# Patient Record
Sex: FEMALE | Race: WHITE | NOT HISPANIC OR LATINO | Employment: FULL TIME | URBAN - METROPOLITAN AREA
[De-identification: names, ages, dates, MRNs, and addresses within clinical notes are randomized per-mention and may not be internally consistent; named-entity substitution may affect disease eponyms.]

---

## 2023-11-24 ENCOUNTER — HOSPITAL ENCOUNTER (OUTPATIENT)
Facility: HOSPITAL | Age: 33
Setting detail: OBSERVATION
Discharge: HOME | End: 2023-11-24
Attending: STUDENT IN AN ORGANIZED HEALTH CARE EDUCATION/TRAINING PROGRAM | Admitting: EMERGENCY MEDICINE
Payer: COMMERCIAL

## 2023-11-24 ENCOUNTER — APPOINTMENT (OUTPATIENT)
Dept: CT IMAGING | Facility: HOSPITAL | Age: 33
End: 2023-11-24
Payer: COMMERCIAL

## 2023-11-24 VITALS
HEIGHT: 64 IN | RESPIRATION RATE: 16 BRPM | BODY MASS INDEX: 26.46 KG/M2 | OXYGEN SATURATION: 98 % | SYSTOLIC BLOOD PRESSURE: 105 MMHG | DIASTOLIC BLOOD PRESSURE: 69 MMHG | HEART RATE: 75 BPM | WEIGHT: 155 LBS | TEMPERATURE: 97.5 F

## 2023-11-24 DIAGNOSIS — R19.7 DIARRHEA, UNSPECIFIED TYPE: ICD-10-CM

## 2023-11-24 DIAGNOSIS — R19.7 NAUSEA, VOMITING, AND DIARRHEA: ICD-10-CM

## 2023-11-24 DIAGNOSIS — N13.30 HYDRONEPHROSIS OF RIGHT KIDNEY: ICD-10-CM

## 2023-11-24 DIAGNOSIS — N20.1 URETERAL STONE: ICD-10-CM

## 2023-11-24 DIAGNOSIS — N23 RENAL COLIC: ICD-10-CM

## 2023-11-24 DIAGNOSIS — N39.0 ACUTE UTI (URINARY TRACT INFECTION): ICD-10-CM

## 2023-11-24 DIAGNOSIS — R11.2 NAUSEA, VOMITING, AND DIARRHEA: ICD-10-CM

## 2023-11-24 DIAGNOSIS — R10.9 RIGHT FLANK PAIN: Primary | ICD-10-CM

## 2023-11-24 DIAGNOSIS — K62.89 RECTUM INFLAMMATION: ICD-10-CM

## 2023-11-24 LAB
ALBUMIN SERPL-MCNC: 4.5 G/DL (ref 3.5–5.2)
ALBUMIN/GLOB SERPL: 1.6 G/DL
ALP SERPL-CCNC: 54 U/L (ref 39–117)
ALT SERPL W P-5'-P-CCNC: 23 U/L (ref 1–33)
ANION GAP SERPL CALCULATED.3IONS-SCNC: 14.4 MMOL/L (ref 5–15)
AST SERPL-CCNC: 23 U/L (ref 1–32)
BACTERIA UR QL AUTO: ABNORMAL /HPF
BASOPHILS # BLD AUTO: 0.05 10*3/MM3 (ref 0–0.2)
BASOPHILS NFR BLD AUTO: 0.5 % (ref 0–1.5)
BILIRUB SERPL-MCNC: 1.2 MG/DL (ref 0–1.2)
BILIRUB UR QL STRIP: NEGATIVE
BUN SERPL-MCNC: 12 MG/DL (ref 6–20)
BUN/CREAT SERPL: 14.3 (ref 7–25)
CALCIUM SPEC-SCNC: 9.4 MG/DL (ref 8.6–10.5)
CHLORIDE SERPL-SCNC: 99 MMOL/L (ref 98–107)
CLARITY UR: ABNORMAL
CO2 SERPL-SCNC: 23.6 MMOL/L (ref 22–29)
COD CRY URNS QL: ABNORMAL /HPF
COLOR UR: ABNORMAL
CREAT SERPL-MCNC: 0.84 MG/DL (ref 0.57–1)
D-LACTATE SERPL-SCNC: 0.9 MMOL/L (ref 0.5–2)
DEPRECATED RDW RBC AUTO: 39.8 FL (ref 37–54)
EGFRCR SERPLBLD CKD-EPI 2021: 95.4 ML/MIN/1.73
EOSINOPHIL # BLD AUTO: 0.09 10*3/MM3 (ref 0–0.4)
EOSINOPHIL NFR BLD AUTO: 0.9 % (ref 0.3–6.2)
ERYTHROCYTE [DISTWIDTH] IN BLOOD BY AUTOMATED COUNT: 12.7 % (ref 12.3–15.4)
GLOBULIN UR ELPH-MCNC: 2.8 GM/DL
GLUCOSE SERPL-MCNC: 97 MG/DL (ref 65–99)
GLUCOSE UR STRIP-MCNC: NEGATIVE MG/DL
HCG SERPL QL: NEGATIVE
HCT VFR BLD AUTO: 42.1 % (ref 34–46.6)
HGB BLD-MCNC: 14.3 G/DL (ref 12–15.9)
HGB UR QL STRIP.AUTO: ABNORMAL
HYALINE CASTS UR QL AUTO: ABNORMAL /LPF
IMM GRANULOCYTES # BLD AUTO: 0.03 10*3/MM3 (ref 0–0.05)
IMM GRANULOCYTES NFR BLD AUTO: 0.3 % (ref 0–0.5)
KETONES UR QL STRIP: ABNORMAL
LEUKOCYTE ESTERASE UR QL STRIP.AUTO: ABNORMAL
LIPASE SERPL-CCNC: 41 U/L (ref 13–60)
LYMPHOCYTES # BLD AUTO: 2.17 10*3/MM3 (ref 0.7–3.1)
LYMPHOCYTES NFR BLD AUTO: 22.1 % (ref 19.6–45.3)
MAGNESIUM SERPL-MCNC: 2.3 MG/DL (ref 1.6–2.6)
MCH RBC QN AUTO: 29.6 PG (ref 26.6–33)
MCHC RBC AUTO-ENTMCNC: 34 G/DL (ref 31.5–35.7)
MCV RBC AUTO: 87.2 FL (ref 79–97)
MONOCYTES # BLD AUTO: 0.52 10*3/MM3 (ref 0.1–0.9)
MONOCYTES NFR BLD AUTO: 5.3 % (ref 5–12)
NEUTROPHILS NFR BLD AUTO: 6.94 10*3/MM3 (ref 1.7–7)
NEUTROPHILS NFR BLD AUTO: 70.9 % (ref 42.7–76)
NITRITE UR QL STRIP: NEGATIVE
NRBC BLD AUTO-RTO: 0 /100 WBC (ref 0–0.2)
PH UR STRIP.AUTO: 6.5 [PH] (ref 5–8)
PLATELET # BLD AUTO: 276 10*3/MM3 (ref 140–450)
PMV BLD AUTO: 11 FL (ref 6–12)
POTASSIUM SERPL-SCNC: 3.2 MMOL/L (ref 3.5–5.2)
PROT SERPL-MCNC: 7.3 G/DL (ref 6–8.5)
PROT UR QL STRIP: ABNORMAL
RBC # BLD AUTO: 4.83 10*6/MM3 (ref 3.77–5.28)
RBC # UR STRIP: ABNORMAL /HPF
REF LAB TEST METHOD: ABNORMAL
SODIUM SERPL-SCNC: 137 MMOL/L (ref 136–145)
SP GR UR STRIP: 1.02 (ref 1–1.03)
SQUAMOUS #/AREA URNS HPF: ABNORMAL /HPF
UROBILINOGEN UR QL STRIP: ABNORMAL
WBC # UR STRIP: ABNORMAL /HPF
WBC NRBC COR # BLD AUTO: 9.8 10*3/MM3 (ref 3.4–10.8)

## 2023-11-24 PROCEDURE — 25510000001 IOPAMIDOL 61 % SOLUTION: Performed by: STUDENT IN AN ORGANIZED HEALTH CARE EDUCATION/TRAINING PROGRAM

## 2023-11-24 PROCEDURE — G0378 HOSPITAL OBSERVATION PER HR: HCPCS

## 2023-11-24 PROCEDURE — 85025 COMPLETE CBC W/AUTO DIFF WBC: CPT | Performed by: STUDENT IN AN ORGANIZED HEALTH CARE EDUCATION/TRAINING PROGRAM

## 2023-11-24 PROCEDURE — 25010000002 CEFTRIAXONE PER 250 MG: Performed by: PHYSICIAN ASSISTANT

## 2023-11-24 PROCEDURE — 80053 COMPREHEN METABOLIC PANEL: CPT | Performed by: STUDENT IN AN ORGANIZED HEALTH CARE EDUCATION/TRAINING PROGRAM

## 2023-11-24 PROCEDURE — 25010000002 KETOROLAC TROMETHAMINE PER 15 MG: Performed by: PHYSICIAN ASSISTANT

## 2023-11-24 PROCEDURE — 81001 URINALYSIS AUTO W/SCOPE: CPT | Performed by: STUDENT IN AN ORGANIZED HEALTH CARE EDUCATION/TRAINING PROGRAM

## 2023-11-24 PROCEDURE — 83690 ASSAY OF LIPASE: CPT | Performed by: STUDENT IN AN ORGANIZED HEALTH CARE EDUCATION/TRAINING PROGRAM

## 2023-11-24 PROCEDURE — 25810000003 SODIUM CHLORIDE 0.9 % SOLUTION: Performed by: STUDENT IN AN ORGANIZED HEALTH CARE EDUCATION/TRAINING PROGRAM

## 2023-11-24 PROCEDURE — 83735 ASSAY OF MAGNESIUM: CPT | Performed by: PHYSICIAN ASSISTANT

## 2023-11-24 PROCEDURE — 25010000002 MORPHINE PER 10 MG: Performed by: STUDENT IN AN ORGANIZED HEALTH CARE EDUCATION/TRAINING PROGRAM

## 2023-11-24 PROCEDURE — 84703 CHORIONIC GONADOTROPIN ASSAY: CPT | Performed by: STUDENT IN AN ORGANIZED HEALTH CARE EDUCATION/TRAINING PROGRAM

## 2023-11-24 PROCEDURE — 36415 COLL VENOUS BLD VENIPUNCTURE: CPT

## 2023-11-24 PROCEDURE — 25010000002 HYDROMORPHONE PER 4 MG: Performed by: STUDENT IN AN ORGANIZED HEALTH CARE EDUCATION/TRAINING PROGRAM

## 2023-11-24 PROCEDURE — 99285 EMERGENCY DEPT VISIT HI MDM: CPT

## 2023-11-24 PROCEDURE — 96374 THER/PROPH/DIAG INJ IV PUSH: CPT

## 2023-11-24 PROCEDURE — 96376 TX/PRO/DX INJ SAME DRUG ADON: CPT

## 2023-11-24 PROCEDURE — 83605 ASSAY OF LACTIC ACID: CPT | Performed by: PHYSICIAN ASSISTANT

## 2023-11-24 PROCEDURE — 87040 BLOOD CULTURE FOR BACTERIA: CPT | Performed by: PHYSICIAN ASSISTANT

## 2023-11-24 PROCEDURE — 25010000002 ONDANSETRON PER 1 MG: Performed by: STUDENT IN AN ORGANIZED HEALTH CARE EDUCATION/TRAINING PROGRAM

## 2023-11-24 PROCEDURE — 96375 TX/PRO/DX INJ NEW DRUG ADDON: CPT

## 2023-11-24 PROCEDURE — 25010000002 HYDROMORPHONE PER 4 MG: Performed by: EMERGENCY MEDICINE

## 2023-11-24 PROCEDURE — 74177 CT ABD & PELVIS W/CONTRAST: CPT

## 2023-11-24 RX ORDER — TAMSULOSIN HYDROCHLORIDE 0.4 MG/1
0.4 CAPSULE ORAL DAILY
Status: DISCONTINUED | OUTPATIENT
Start: 2023-11-24 | End: 2023-11-24 | Stop reason: HOSPADM

## 2023-11-24 RX ORDER — HYDROMORPHONE HYDROCHLORIDE 1 MG/ML
0.5 INJECTION, SOLUTION INTRAMUSCULAR; INTRAVENOUS; SUBCUTANEOUS
Status: DISCONTINUED | OUTPATIENT
Start: 2023-11-24 | End: 2023-11-24 | Stop reason: HOSPADM

## 2023-11-24 RX ORDER — KETOROLAC TROMETHAMINE 15 MG/ML
15 INJECTION, SOLUTION INTRAMUSCULAR; INTRAVENOUS ONCE
Status: COMPLETED | OUTPATIENT
Start: 2023-11-24 | End: 2023-11-24

## 2023-11-24 RX ORDER — SODIUM CHLORIDE 0.9 % (FLUSH) 0.9 %
10 SYRINGE (ML) INJECTION AS NEEDED
Status: DISCONTINUED | OUTPATIENT
Start: 2023-11-24 | End: 2023-11-24 | Stop reason: HOSPADM

## 2023-11-24 RX ORDER — MORPHINE SULFATE 2 MG/ML
4 INJECTION, SOLUTION INTRAMUSCULAR; INTRAVENOUS ONCE
Status: COMPLETED | OUTPATIENT
Start: 2023-11-24 | End: 2023-11-24

## 2023-11-24 RX ORDER — IBUPROFEN 800 MG/1
800 TABLET ORAL EVERY 8 HOURS PRN
Qty: 15 TABLET | Refills: 0 | Status: SHIPPED | OUTPATIENT
Start: 2023-11-24 | End: 2023-11-29

## 2023-11-24 RX ORDER — ALBUTEROL SULFATE 1.25 MG/3ML
1 SOLUTION RESPIRATORY (INHALATION) EVERY 6 HOURS PRN
Status: DISCONTINUED | OUTPATIENT
Start: 2023-11-24 | End: 2023-11-24 | Stop reason: HOSPADM

## 2023-11-24 RX ORDER — CHOLECALCIFEROL (VITAMIN D3) 125 MCG
5 CAPSULE ORAL NIGHTLY PRN
Status: DISCONTINUED | OUTPATIENT
Start: 2023-11-24 | End: 2023-11-24 | Stop reason: HOSPADM

## 2023-11-24 RX ORDER — ONDANSETRON 2 MG/ML
4 INJECTION INTRAMUSCULAR; INTRAVENOUS ONCE
Status: COMPLETED | OUTPATIENT
Start: 2023-11-24 | End: 2023-11-24

## 2023-11-24 RX ORDER — SODIUM CHLORIDE 9 MG/ML
40 INJECTION, SOLUTION INTRAVENOUS AS NEEDED
Status: DISCONTINUED | OUTPATIENT
Start: 2023-11-24 | End: 2023-11-24 | Stop reason: HOSPADM

## 2023-11-24 RX ORDER — ONDANSETRON 4 MG/1
4 TABLET, FILM COATED ORAL EVERY 6 HOURS PRN
Status: DISCONTINUED | OUTPATIENT
Start: 2023-11-24 | End: 2023-11-24 | Stop reason: HOSPADM

## 2023-11-24 RX ORDER — ACETAMINOPHEN 325 MG/1
650 TABLET ORAL EVERY 4 HOURS PRN
Status: DISCONTINUED | OUTPATIENT
Start: 2023-11-24 | End: 2023-11-24 | Stop reason: HOSPADM

## 2023-11-24 RX ORDER — NITROGLYCERIN 0.4 MG/1
0.4 TABLET SUBLINGUAL
Status: DISCONTINUED | OUTPATIENT
Start: 2023-11-24 | End: 2023-11-24 | Stop reason: HOSPADM

## 2023-11-24 RX ORDER — NALOXONE HCL 0.4 MG/ML
0.4 VIAL (ML) INJECTION
Status: DISCONTINUED | OUTPATIENT
Start: 2023-11-24 | End: 2023-11-24 | Stop reason: HOSPADM

## 2023-11-24 RX ORDER — SODIUM CHLORIDE 0.9 % (FLUSH) 0.9 %
10 SYRINGE (ML) INJECTION EVERY 12 HOURS SCHEDULED
Status: DISCONTINUED | OUTPATIENT
Start: 2023-11-24 | End: 2023-11-24 | Stop reason: HOSPADM

## 2023-11-24 RX ORDER — HYDROMORPHONE HYDROCHLORIDE 1 MG/ML
0.5 INJECTION, SOLUTION INTRAMUSCULAR; INTRAVENOUS; SUBCUTANEOUS ONCE
Status: COMPLETED | OUTPATIENT
Start: 2023-11-24 | End: 2023-11-24

## 2023-11-24 RX ORDER — TAMSULOSIN HYDROCHLORIDE 0.4 MG/1
0.4 CAPSULE ORAL DAILY
Status: DISCONTINUED | OUTPATIENT
Start: 2023-11-24 | End: 2023-11-24

## 2023-11-24 RX ORDER — ONDANSETRON 4 MG/1
4 TABLET, FILM COATED ORAL EVERY 6 HOURS PRN
Qty: 15 TABLET | Refills: 0 | Status: SHIPPED | OUTPATIENT
Start: 2023-11-24

## 2023-11-24 RX ORDER — ONDANSETRON 2 MG/ML
4 INJECTION INTRAMUSCULAR; INTRAVENOUS EVERY 6 HOURS PRN
Status: DISCONTINUED | OUTPATIENT
Start: 2023-11-24 | End: 2023-11-24 | Stop reason: HOSPADM

## 2023-11-24 RX ORDER — HYDROCODONE BITARTRATE AND ACETAMINOPHEN 5; 325 MG/1; MG/1
1 TABLET ORAL EVERY 4 HOURS PRN
Status: DISCONTINUED | OUTPATIENT
Start: 2023-11-24 | End: 2023-11-24 | Stop reason: HOSPADM

## 2023-11-24 RX ORDER — TAMSULOSIN HYDROCHLORIDE 0.4 MG/1
1 CAPSULE ORAL DAILY
Qty: 7 CAPSULE | Refills: 0 | Status: SHIPPED | OUTPATIENT
Start: 2023-11-24

## 2023-11-24 RX ORDER — SULFAMETHOXAZOLE AND TRIMETHOPRIM 800; 160 MG/1; MG/1
1 TABLET ORAL 2 TIMES DAILY
Qty: 10 TABLET | Refills: 0 | Status: SHIPPED | OUTPATIENT
Start: 2023-11-24 | End: 2023-11-29

## 2023-11-24 RX ADMIN — IOPAMIDOL 85 ML: 612 INJECTION, SOLUTION INTRAVENOUS at 05:24

## 2023-11-24 RX ADMIN — ONDANSETRON 4 MG: 2 INJECTION INTRAMUSCULAR; INTRAVENOUS at 04:25

## 2023-11-24 RX ADMIN — KETOROLAC TROMETHAMINE 15 MG: 15 INJECTION, SOLUTION INTRAMUSCULAR; INTRAVENOUS at 06:09

## 2023-11-24 RX ADMIN — HYDROMORPHONE HYDROCHLORIDE 0.5 MG: 1 INJECTION, SOLUTION INTRAMUSCULAR; INTRAVENOUS; SUBCUTANEOUS at 08:01

## 2023-11-24 RX ADMIN — MORPHINE SULFATE 4 MG: 2 INJECTION, SOLUTION INTRAMUSCULAR; INTRAVENOUS at 04:27

## 2023-11-24 RX ADMIN — CEFTRIAXONE 2000 MG: 2 INJECTION, POWDER, FOR SOLUTION INTRAMUSCULAR; INTRAVENOUS at 09:35

## 2023-11-24 RX ADMIN — SODIUM CHLORIDE 1000 ML: 9 INJECTION, SOLUTION INTRAVENOUS at 04:30

## 2023-11-24 RX ADMIN — Medication 10 ML: at 09:35

## 2023-11-24 RX ADMIN — HYDROCODONE BITARTRATE AND ACETAMINOPHEN 1 TABLET: 5; 325 TABLET ORAL at 09:48

## 2023-11-24 RX ADMIN — HYDROMORPHONE HYDROCHLORIDE 0.5 MG: 1 INJECTION, SOLUTION INTRAMUSCULAR; INTRAVENOUS; SUBCUTANEOUS at 05:20

## 2023-11-24 RX ADMIN — TAMSULOSIN HYDROCHLORIDE 0.4 MG: 0.4 CAPSULE ORAL at 09:35

## 2023-11-24 NOTE — DISCHARGE INSTRUCTIONS
Medications as directed.  Strain urine and collect stone.  Follow up with urology.  You have also been referred to gastroenterology for findings of rectal inflammation on CT scan, their office will call you to schedule.  Follow-up with your primary care provider.  Return to the emergency department with worsening symptoms, uncontrolled pain, inability to tolerate oral liquids, fever greater than 101°F not controlled by Tylenol or as needed with emergent concerns.

## 2023-11-24 NOTE — H&P
[facility]   HISTORY AND PHYSICAL    Patient Name: Soumya Stout  : 1990  MRN: [id number]  Primary Care Physician:  Lidia Pickard APRN  Date of admission: 2023    Subjective   Subjective     Chief Complaint:   Chief Complaint   Patient presents with    Abdominal Pain    Diarrhea    Vomiting         HPI:    Soumya Stout is a 33 y.o. female with history of asthma and ADHD, who presented to the emergency department today complaining of right-sided abdominal pain, nausea, vomiting, and diarrhea.  Patient states she started to feel ill last night with decreased appetite and dull right-sided abdominal pain.  She then started having diarrhea and then nausea and an episode of vomiting.  She continued to feel like she cannot get comfortable so she presented to the emergency department.  CT scan in ED shows obstructive 2 mm calculus within the right UPJ.  It also showed diffuse bladder wall thickening with mild adjacent stranding.  Also with diffuse annular thickening of the colon and rectum with submucosal fat deposition.  Urinalysis does have 1+ bacteria but nitrite negative, contaminated with 13-20 squamous epithelial cells.    Patient required several pain medications in ED, at time of arrival to observation unit her pain is well-controlled.  She denies dysuria.  She has had no further episodes of vomiting or diarrhea.  Patient is being admitted to observation unit for pain control and monitoring.  Urology consulted in the ED, provider discussed with Dr. Romero, he does not feel patient is surgical candidate and recommends discharge home.    Review of Systems   All systems were reviewed and negative except for: What is discussed in the HPI    Personal History     Past Medical History:   Diagnosis Date    ADHD (attention deficit hyperactivity disorder)     Asthma        Past Surgical History:   Procedure Laterality Date    INDUCED          Family History: family history  includes Hypertension in her mother; No Known Problems in her father. Otherwise pertinent FHx was reviewed and not pertinent to current issue.    Social History:  reports that she has never smoked. She has never used smokeless tobacco. She reports that she does not currently use alcohol after a past usage of about 2.0 standard drinks of alcohol per week. Drug use questions deferred to the physician.    Home Medications:  Etonogestrel, albuterol, albuterol sulfate HFA, doxycycline, ipratropium, melatonin, phentermine, and predniSONE    Allergies:  Allergies   Allergen Reactions    Codeine Unknown (See Comments)     From childhood  Other reaction(s): Other (See Comments)  From childhood, unknown response    Penicillins Rash       Objective   Objective     Vitals:   Temp:  [97.3 °F (36.3 °C)-98.2 °F (36.8 °C)] 97.3 °F (36.3 °C)  Heart Rate:  [63-71] 63  Resp:  [16] 16  BP: (107-162)/(70-98) 107/77  Physical Exam     GENERAL: 33 y.o. YO female a/o x 4, NAD  SKIN: Warm pink and dry   HEENT:  PERRL, EOM intact, conjunctivae normal, sclerae clear  NECK: supple, no JVD  LUNGS: Clear to auscultation bilaterally without wheezes, rales or rhonchi.  No accessory muscle use and no nasal flaring.  CARDIAC:  Regular rate and rhythm, S1-S2.  No murmurs, rubs or gallops.  No peripheral edema.  Equal pulses bilaterally.  ABDOMEN: Soft, nontender, nondistended.  No guarding or rebound tenderness.  Normal bowel sounds.  MUSCULOSKELETAL: Moves all extremities well.  No deformity.  NEURO: Cranial nerves II through XII grossly intact.  No gross focal deficits.  Alert.  Normal speech and motor.  PSYCH: Normal mood and affect      Result Review    Result Review:  I have personally reviewed the results from the time of this admission to 11/24/2023 10:34 EST and agree with these findings:  []  Laboratory list / accordion  []  Microbiology  []  Radiology  []  EKG/Telemetry   []  Cardiology/Vascular   []  Pathology  []  Old records  []   Other:  Most notable findings include:     EXAM:    CT Abdomen and Pelvis With Intravenous Contrast  IMPRESSION:       1.  Obstructive 2 mm calculus within the right ureteropelvic junction   (not visualized on  imaging) with upstream mild right renal   hydronephrosis with perinephric stranding, periureteral stranding,   delayed right nephrogram.  2.  Nonobstructive calculus measuring 2 mm within the severe pole of the   right kidney.  3.  Diffuse annular thickening of the colon and rectum with submucosal   fat deposition.  Findings may relate to proctocolitis.  Submucosal fat   deposition implies chronicity.  4.  Diffuse bladder wall thickening with mild adjacent stranding.    Findings can be seen with cystitis.  Consider correlation with laboratory   values.  5.  No other acute findings.  6.  Incidental findings as described.      Assessment & Plan   Assessment / Plan     Brief Patient Summary:  Soumya Stout is a 33 y.o. female who is being admitted observation unit for further evaluation of ureteral stone    Active Hospital Problems:  Active Hospital Problems    Diagnosis     **Ureteral stone      Plan:     Ureterolithiasis  -Pain control  -ED provider discussed with urology, they do not feel patient is a surgical candidate and recommend discharge home  -Patient received 2 g Rocephin in ED  -Flomax  -Strain urine    Diarrhea  Abnormal imaging of rectum and colon  -CT shows diffuse annular thickening of the colon and rectum with submucosal fat deposition  -Findings may relate to proctocolitis but submucosal fat deposition implies chronicity  -Patient reports episodes of diarrhea approximately 1-2 times per month  -Ambulatory referral to gastroenterology    DVT prophylaxis:  Mechanical DVT prophylaxis orders are present.    CODE STATUS:    Level Of Support Discussed With: Patient  Code Status (Patient has no pulse and is not breathing): CPR (Attempt to Resuscitate)  Medical Interventions (Patient has  pulse or is breathing): Full Support    Admission Status:  I believe this patient meets observation status.     75 minutes has been spent by [facility] providers in the care of this patient while under observation status    I wore an appropriate PPE during this patient encounter.  Hand hygeine performed before and after seeing the patient.    Electronically signed by MIKE Borges, 11/24/23, 10:34 AM EST.

## 2023-11-24 NOTE — DISCHARGE SUMMARY
ED OBSERVATION PROGRESS/DISCHARGE SUMMARY    Date of Admission: 11/24/2023   LOS: 0 days   PCP: Lidia Pickard APRN    Subjective patient reports significant improvement in her symptoms since this morning.  Denies recurrence of pain.  She is tolerating p.o., had grilled cheese sandwich for lunch without recurrence of pain or diarrhea.    Hospital Outcome: 33-year-old female admitted to the observation unit for further evaluation of right-sided abdominal pain, nausea, vomiting, diarrhea.  CT scan in ED shows obstructive 2 mm calculus within the right UPJ, diffuse bladder wall thickening and mild adjacent stranding.  Patient received pain medications and IV antibiotics.  She has been pain-free since arrival to observation unit without any additional pain medication.  Patient was discussed with urology by ED provider, they did not feel consultation and surgical intervention would be warranted at this time.  Ambulatory referral placed for urology.  Patient will be discharged with Flomax, Bactrim, Zofran, and ibuprofen.  Patient also has some findings of diffuse annular thickening of the colon and rectum on CT imaging.  Ambulatory referral to gastroenterology placed.  Usual return to ER precautions advised, patient expressed understanding and is in agreement with plan.    ROS:  General: no fevers, chills  Respiratory: no cough, dyspnea  Cardiovascular: no chest pain, palpitations  Abdomen: No abdominal pain, nausea, vomiting, or diarrhea  Neurologic: No focal weakness    Objective   Physical Exam:  I have reviewed the vital signs.  Temp:  [97.3 °F (36.3 °C)-98.2 °F (36.8 °C)] 97.5 °F (36.4 °C)  Heart Rate:  [63-75] 75  Resp:  [16] 16  BP: (105-162)/(69-98) 105/69  General Appearance:    Alert, cooperative, no distress  Head:    Normocephalic, atraumatic  Eyes:    Sclerae anicteric  Neck:   Supple, no mass  Lungs: Clear to auscultation bilaterally, respirations unlabored  Heart: Regular rate and rhythm, S1 and S2  normal, no murmur, rub or gallop  Abdomen:  Soft, nontender, bowel sounds active, nondistended  Extremities: No clubbing, cyanosis, or edema to lower extremities  Pulses:  2+ and symmetric in distal lower extremities  Skin: No rashes   Neurologic: Oriented x3, Normal strength to extremities    Results Review:    I have reviewed the labs, radiology results and diagnostic studies.    Results from last 7 days   Lab Units 11/24/23  0420   WBC 10*3/mm3 9.80   HEMOGLOBIN g/dL 14.3   HEMATOCRIT % 42.1   PLATELETS 10*3/mm3 276     Results from last 7 days   Lab Units 11/24/23  0420   SODIUM mmol/L 137   POTASSIUM mmol/L 3.2*   CHLORIDE mmol/L 99   CO2 mmol/L 23.6   BUN mg/dL 12   CREATININE mg/dL 0.84   CALCIUM mg/dL 9.4   BILIRUBIN mg/dL 1.2   ALK PHOS U/L 54   ALT (SGPT) U/L 23   AST (SGOT) U/L 23   GLUCOSE mg/dL 97     Imaging Results (Last 24 Hours)       Procedure Component Value Units Date/Time    CT Abdomen Pelvis With Contrast [[id number]] Collected: 11/24/23 0723     Updated: 11/24/23 0728    Addenda:        ADDENDUM:  11 24 23 07:28 Verify Receipt Verified receipt with  [facility]  on   11 24 07:28 (-05:00)      Signed: 11/24/23 0722 by Allen Rivera MD    Narrative:      CR  Patient: SYDNEY NUR  Time Out: 07:22  Exam(s): CT ABDOMEN + PELVIS With Contrast     EXAM:    CT Abdomen and Pelvis With Intravenous Contrast    CLINICAL HISTORY:     Reason for exam: Right-sided abdominal pain.    TECHNIQUE:    Axial computed tomography images of the abdomen and pelvis with   intravenous contrast.  CTDI is 8.04 mGy and DLP is 396.4 mGy-cm.  This CT   exam was performed according to the principle of ALARA (As Low As   Reasonably Achievable) by using one or more of the following dose   reduction techniques: automated exposure control, adjustment of the mA   and or kV according to patient size, and or use of iterative   reconstruction technique.    COMPARISON:    No relevant prior studies  available.    FINDINGS:    Lung bases:  Unremarkable.  No mass.  No consolidation.     ABDOMEN:    Liver:  Unremarkable.  No mass.    Gallbladder and bile ducts:  Unremarkable.  No calcified stones.  No   ductal dilation.    Pancreas:  Unremarkable.  No mass.  No ductal dilation.    Spleen:  Unremarkable.  No splenomegaly.    Adrenals:  Unremarkable.  No mass.    Kidneys and ureters:  Obstructive 2 mm calculus within the right   ureteropelvic junction (not visualized on  imaging) with upstream   mild right renal hydronephrosis with perinephric stranding, periureteral   stranding, delayed right nephrogram.  Nonobstructive calculus measuring 2   mm within the severe pole of the right kidney.  Bilateral renal cysts,   too small to characterize.    Stomach and bowel:  No evidence of bowel obstruction.  Diffuse annular   thickening of the colon and rectum with submucosal fat deposition.    Findings may relate to proctocolitis.  Submucosal fat deposition implies   chronicity.     PELVIS:    Appendix:  No findings to suggest acute appendicitis.    Bladder:  Diffuse bladder wall thickening with mild adjacent stranding.    Findings can be seen with cystitis.  Consider correlation with   laboratory values.    Reproductive:  Unremarkable as visualized.     ABDOMEN and PELVIS:    Intraperitoneal space:  Unremarkable.  No free air.  No significant   fluid collection.    Bones joints:  Degenerative changes in the spine.  No acute fracture.    No dislocation.    Soft tissues:  Umbilical hernia containing fat.    Vasculature:  Unremarkable.  No abdominal aortic aneurysm.    Lymph nodes:  Unremarkable.  No enlarged lymph nodes.    IMPRESSION:       1.  Obstructive 2 mm calculus within the right ureteropelvic junction   (not visualized on  imaging) with upstream mild right renal   hydronephrosis with perinephric stranding, periureteral stranding,   delayed right nephrogram.  2.  Nonobstructive calculus measuring 2 mm within  the severe pole of the   right kidney.  3.  Diffuse annular thickening of the colon and rectum with submucosal   fat deposition.  Findings may relate to proctocolitis.  Submucosal fat   deposition implies chronicity.  4.  Diffuse bladder wall thickening with mild adjacent stranding.    Findings can be seen with cystitis.  Consider correlation with laboratory   values.  5.  No other acute findings.  6.  Incidental findings as described.      Impression:            Communications:     Verify Receipt     Electronically signed by Allen Rivera MD on 11-24-23 at 0722            I have reviewed the medications.  ---------------------------------------------------------------------------------------------  Assessment & Plan   Assessment/Problem List    Ureteral stone      Plan:  Ureterolithiasis  -Pain control  -ED provider discussed with urology, they do not feel patient is a surgical candidate and recommend discharge home  -Patient received 2 g Rocephin in ED  -Flomax  -Strain urine    -Discharged with course of Flomax, ibuprofen, Zofran, and antibiotics  -Follow-up outpatient with urology     Diarrhea  Abnormal imaging of rectum and colon  -CT shows diffuse annular thickening of the colon and rectum with submucosal fat deposition  -Findings may relate to proctocolitis but submucosal fat deposition implies chronicity  -Patient reports episodes of diarrhea approximately 1-2 times per month  -Ambulatory referral to gastroenterology    Disposition: Home    Follow-up after Discharge: PCP, urology, gastroenterology    This note will serve as discharge summary    MIKE Borges 11/24/23 15:13 EST    I have worn appropriate PPE during this patient encounter and sanitized my hands with entering and exiting patient room.

## 2023-11-24 NOTE — ED PROVIDER NOTES
MD ATTESTATION NOTE    The BORA and I have discussed this patient's history, physical exam, and treatment plan.  I have reviewed the documentation and personally had a face to face interaction with the patient. I affirm the documentation and agree with the treatment and plan.  The attached note describes my personal findings.      I provided a substantive portion of the care of the patient.  I personally performed the physical exam in its entirety, and below are my findings.  For this patient encounter, the patient wore surgical mask, I wore full protective PPE including N95 and eye protection.      Brief HPI: Patient presented emergency department with right-sided abdominal pain, change in urination.  No other recent illness, fever, chills.  Some diarrhea and nausea/vomiting as well.  Started today.    PHYSICAL EXAM  ED Triage Vitals   Temp Heart Rate Resp BP SpO2   11/24/23 0343 11/24/23 0343 11/24/23 0343 11/24/23 0348 11/24/23 0343   98.2 °F (36.8 °C) 66 16 162/98 98 %      Temp src Heart Rate Source Patient Position BP Location FiO2 (%)   11/24/23 0343 11/24/23 0343 11/24/23 0348 11/24/23 0348 --   Tympanic Monitor Sitting Right arm          GENERAL: no acute distress  HENT: nares patent  EYES: no scleral icterus  CV: regular rhythm, normal rate  RESPIRATORY: normal effort  ABDOMEN: soft  MUSCULOSKELETAL: no deformity  NEURO: alert, moves all extremities, follows commands  PSYCH:  calm, cooperative  SKIN: warm, dry    Vital signs and nursing notes reviewed.        Plan: Patient presented emergency department with right-sided abdominal pain, otherwise well-appearing, vitals otherwise stable.  Labs reassuring, urine showing contamination however with pyuria/bacteriuria.  Imaging demonstrating changes consistent with right-sided ureteral stone.  Plan for reevaluation after radiology interpretation for disposition.    ED Course as of 11/26/23 1522   Fri Nov 24, 2023   0453 WBC: 9.80 [GIO]   0453 Hemoglobin: 14.3 [GIO]    0453 Hematocrit: 42.1 [GIO]   0453 Platelets: 276 [GIO]   0453 Glucose: 97 [GIO]   0453 BUN: 12 [GIO]   0453 Creatinine: 0.84 [GIO]   0453 Sodium: 137 [GIO]   0453 Potassium(!): 3.2 [GIO]   0453 Chloride: 99 [GIO]   0453 CO2: 23.6 [GIO]   0454 Lipase: 41 [GIO]   0454 Ketones, UA(!): 80 mg/dL (3+) [GIO]   0508 RBC, UA(!): 11-20 [GIO]   0508 WBC, UA(!): 3-5 [GIO]   0508 Bacteria, UA(!): 1+ [GIO]   0508 Squamous Epithelial Cells, UA(!): 13-20 [GIO]   0511 HCG Qualitative: Negative [GIO]   0529 CT abdomen pelvis interpreted myself:  Right hydroureteronephrosis with associated perinephric stranding and bladder wall thickening concerning for urinary tract infection.  Possible small stone at the UVJ [FS]   0600 Patient history, ER presentation and evaluation discussed with and patient care turned over to ALTHEA Zavala PA-C. [GIO]   0736 Patient reassessed at this time.  She continues with some mild discomfort although significantly improved.  She does have some evidence of bacteriuria and an obstructive stone on her CT imaging.  With evidence of cystitis I feel that she would most appropriately be managed with IV antibiotics, urologic consultation, continued pain control in the observation unit.  Patient agreeable with this plan and all questions answered. [DC]      ED Course User Index  [DC] Morris Zavala PA  [FS] Dallas Alberto MD  [GIO] Trey Arellano PA Shary, Frank S, MD  11/24/23 0606       Dallas Alberto MD  11/26/23 3222

## 2023-11-24 NOTE — ED NOTES
Nursing report ED to floor  Soumya Stout  33 y.o.  female    HPI :   Chief Complaint   Patient presents with    Abdominal Pain    Diarrhea    Vomiting       Admitting doctor:   Gagandeep Ritter MD    Admitting diagnosis:   The primary encounter diagnosis was Right flank pain. Diagnoses of Nausea, vomiting, and diarrhea, Hydronephrosis of right kidney, Renal colic, and Acute UTI (urinary tract infection) were also pertinent to this visit.    Code status:   Current Code Status       Date Active Code Status Order ID Comments User Context       11/24/2023 0738 CPR (Attempt to Resuscitate) [id number]  Darling Childs PA ED        Question Answer    Code Status (Patient has no pulse and is not breathing) CPR (Attempt to Resuscitate)    Medical Interventions (Patient has pulse or is breathing) Full Support    Level Of Support Discussed With Patient                    Allergies:   Codeine and Penicillins    Isolation:   No active isolations    Intake and Output  No intake or output data in the 24 hours ending 11/24/23 0748    Weight:       11/24/23 0343   Weight: 70.3 kg (155 lb)       Most recent vitals:   Vitals:    11/24/23 0348 11/24/23 0601 11/24/23 0631 11/24/23 0701   BP: 162/98 142/89 129/79 121/70   BP Location: Right arm      Patient Position: Sitting      Pulse:  71 66 64   Resp:       Temp:       TempSrc:       SpO2:  100%     Weight:       Height:           Active LDAs/IV Access:   Lines, Drains & Airways       Active LDAs       Name Placement date Placement time Site Days    Peripheral IV 11/24/23 0416 Left Antecubital 11/24/23 0416  Antecubital  less than 1                    Labs (abnormal labs have a star):   Labs Reviewed   COMPREHENSIVE METABOLIC PANEL - Abnormal; Notable for the following components:       Result Value    Potassium 3.2 (*)     All other components within normal limits    Narrative:     GFR Normal >60  Chronic Kidney Disease <60  Kidney Failure <15     URINALYSIS W/ MICROSCOPIC  IF INDICATED (NO CULTURE) - Abnormal; Notable for the following components:    Color, UA Dark Yellow (*)     Appearance, UA Cloudy (*)     Ketones, UA 80 mg/dL (3+) (*)     Blood, UA Moderate (2+) (*)     Protein, UA Trace (*)     Leuk Esterase, UA Trace (*)     All other components within normal limits   URINALYSIS, MICROSCOPIC ONLY - Abnormal; Notable for the following components:    RBC, UA 11-20 (*)     WBC, UA 3-5 (*)     Bacteria, UA 1+ (*)     Squamous Epithelial Cells, UA 13-20 (*)     All other components within normal limits   LIPASE - Normal   HCG, SERUM, QUALITATIVE - Normal   CBC WITH AUTO DIFFERENTIAL - Normal   BLOOD CULTURE   BLOOD CULTURE   LACTIC ACID, PLASMA   CBC AND DIFFERENTIAL    Narrative:     The following orders were created for panel order CBC & Differential.  Procedure                               Abnormality         Status                     ---------                               -----------         ------                     CBC Auto Differential[[id number]]        Normal              Final result                 Please view results for these tests on the individual orders.       EKG:   No orders to display       Meds given in ED:   Medications   sodium chloride 0.9 % flush 10 mL (has no administration in time range)   cefTRIAXone (ROCEPHIN) 2,000 mg in sodium chloride 0.9 % 100 mL IVPB-VTB (has no administration in time range)   sodium chloride 0.9 % bolus 1,000 mL ( Intravenous Currently Infusing 11/24/23 0611)   ondansetron (ZOFRAN) injection 4 mg (4 mg Intravenous Given 11/24/23 0425)   morphine injection 4 mg (4 mg Intravenous Given 11/24/23 0427)   HYDROmorphone (DILAUDID) injection 0.5 mg (0.5 mg Intravenous Given 11/24/23 0520)   iopamidol (ISOVUE-300) 61 % injection 85 mL (85 mL Intravenous Given 11/24/23 0524)   ketorolac (TORADOL) injection 15 mg (15 mg Intravenous Given 11/24/23 0609)       Imaging results:  CT Abdomen Pelvis With Contrast    Addendum Date: 11/24/2023     ADDENDUM: 11 24 23 07:28 Verify Receipt Verified receipt with  [facility]  on 11 24 07:28 (-05:00)     Result Date: 11/24/2023  Communications:  Verify Receipt Electronically signed by Allen Rivera MD on 11-24-23 at 0722     Ambulatory status:   - up ad lorenzo    Social issues:   Social History     Socioeconomic History    Marital status: Single   Tobacco Use    Smoking status: Never    Smokeless tobacco: Never   Substance and Sexual Activity    Alcohol use: Yes     Alcohol/week: 3.0 standard drinks of alcohol     Types: 3 Shots of liquor per week     Comment: SOCIALLY (WEEKENDS)    Drug use: Defer    Sexual activity: Defer       NIH Stroke Scale:       Josefina Ragland RN  11/24/23 07:48 EST

## 2023-11-24 NOTE — ED TRIAGE NOTES
Ptts R sided abdominal pain that woke her up from sleep at approx 0100 tonight. Since 0100, has had 1 episode of vomiting and multiple episodes of diarrhea.

## 2023-11-24 NOTE — ED PROVIDER NOTES
EMERGENCY DEPARTMENT ENCOUNTER    Room Number:  117/1  Date of encounter:  2023  PCP: Lidia Pickard APRN  Patient Care Team:  Lidia Pickard APRN as PCP - General (Family Medicine)   Independent Historians: Patient    HPI:  Chief Complaint: Abdominal pain  A complete HPI/ROS/PMH/PSH/SH/FH are unobtainable due to: N/A    Chronic or social conditions impacting patient care (social determinants of health): None    Context: Soumya Stout is a 33 y.o. female with no past medical history who arrives to the ED with complaint of abdominal pain with nausea, vomiting, and diarrhea.  Patient states that she started feeling ill last night with decreased appetite and then developed a dull right-sided abdominal pain.  She then started having diarrhea and then nausea and vomiting.  Patient denies any blood in the vomitus or diarrhea, no fever, dysuria, hematuria, urinary frequency or urgency.  Patient states that after the vomiting and diarrhea she started feeling worse and became short of breath.  Denies any known sick contacts or bad food sources from Thanksgiving yesterday.    Review of prior external notes (non-ED): Patient had telemedicine visit with PCP on 10/23/2023 and was prescribed Medrol Dosepak and Omnicef for presumed sinusitis    Review of prior external test results outside of this encounter: No previous abdominal imaging in our system    PAST MEDICAL HISTORY  Active Ambulatory Problems     Diagnosis Date Noted    No Active Ambulatory Problems     Resolved Ambulatory Problems     Diagnosis Date Noted    No Resolved Ambulatory Problems     Past Medical History:   Diagnosis Date    ADHD (attention deficit hyperactivity disorder)     Asthma        The patient has started, but not completed, their COVID-19 vaccination series.    PAST SURGICAL HISTORY  Past Surgical History:   Procedure Laterality Date    INDUCED            FAMILY HISTORY  Family History   Problem Relation Age of Onset     Hypertension Mother     No Known Problems Father          SOCIAL HISTORY  Social History     Socioeconomic History    Marital status: Single   Tobacco Use    Smoking status: Never    Smokeless tobacco: Never   Vaping Use    Vaping Use: Never used   Substance and Sexual Activity    Alcohol use: Not Currently     Alcohol/week: 2.0 standard drinks of alcohol     Types: 2 Glasses of wine per week     Comment: SOCIALLY (WEEKENDS)    Drug use: Defer    Sexual activity: Defer         ALLERGIES  Codeine and Penicillins        REVIEW OF SYSTEMS  Review of Systems     All systems reviewed and negative except for those discussed in HPI.       PHYSICAL EXAM    I have reviewed the triage vital signs and nursing notes.    ED Triage Vitals   Temp Heart Rate Resp BP SpO2   11/24/23 0343 11/24/23 0343 11/24/23 0343 11/24/23 0348 11/24/23 0343   98.2 °F (36.8 °C) 66 16 162/98 98 %      Temp src Heart Rate Source Patient Position BP Location FiO2 (%)   11/24/23 0343 11/24/23 0343 11/24/23 0348 11/24/23 0348 --   Tympanic Monitor Sitting Right arm        Physical Exam    GENERAL: alert and oriented x 4, mildly ill-appearing, not distressed  HENT: normocephalic, atraumatic, dry mucous membranes  EYES: no scleral icterus, PERRL, EOMI  CV: regular rhythm, regular rate, no murmurs, rubs, or gallops  RESPIRATORY: normal effort, CTAB  ABDOMEN: soft, mild right-sided tenderness without rebound or guarding, normal bowel sounds  MUSCULOSKELETAL: no deformity  NEURO: alert, moves all extremities, no focal neurodeficits, follows commands  SKIN: warm, dry, no rash   Psych: Appropriate mood and affect      Nursing notes and vital signs reviewed      LAB RESULTS  Recent Results (from the past 24 hour(s))   Comprehensive Metabolic Panel    Collection Time: 11/24/23  4:20 AM    Specimen: Blood   Result Value Ref Range    Glucose 97 65 - 99 mg/dL    BUN 12 6 - 20 mg/dL    Creatinine 0.84 0.57 - 1.00 mg/dL    Sodium 137 136 - 145 mmol/L    Potassium 3.2  (L) 3.5 - 5.2 mmol/L    Chloride 99 98 - 107 mmol/L    CO2 23.6 22.0 - 29.0 mmol/L    Calcium 9.4 8.6 - 10.5 mg/dL    Total Protein 7.3 6.0 - 8.5 g/dL    Albumin 4.5 3.5 - 5.2 g/dL    ALT (SGPT) 23 1 - 33 U/L    AST (SGOT) 23 1 - 32 U/L    Alkaline Phosphatase 54 39 - 117 U/L    Total Bilirubin 1.2 0.0 - 1.2 mg/dL    Globulin 2.8 gm/dL    A/G Ratio 1.6 g/dL    BUN/Creatinine Ratio 14.3 7.0 - 25.0    Anion Gap 14.4 5.0 - 15.0 mmol/L    eGFR 95.4 >60.0 mL/min/1.73   Lipase    Collection Time: 11/24/23  4:20 AM    Specimen: Blood   Result Value Ref Range    Lipase 41 13 - 60 U/L   hCG, Serum, Qualitative    Collection Time: 11/24/23  4:20 AM    Specimen: Blood   Result Value Ref Range    HCG Qualitative Negative Negative   CBC Auto Differential    Collection Time: 11/24/23  4:20 AM    Specimen: Blood   Result Value Ref Range    WBC 9.80 3.40 - 10.80 10*3/mm3    RBC 4.83 3.77 - 5.28 10*6/mm3    Hemoglobin 14.3 12.0 - 15.9 g/dL    Hematocrit 42.1 34.0 - 46.6 %    MCV 87.2 79.0 - 97.0 fL    MCH 29.6 26.6 - 33.0 pg    MCHC 34.0 31.5 - 35.7 g/dL    RDW 12.7 12.3 - 15.4 %    RDW-SD 39.8 37.0 - 54.0 fl    MPV 11.0 6.0 - 12.0 fL    Platelets 276 140 - 450 10*3/mm3    Neutrophil % 70.9 42.7 - 76.0 %    Lymphocyte % 22.1 19.6 - 45.3 %    Monocyte % 5.3 5.0 - 12.0 %    Eosinophil % 0.9 0.3 - 6.2 %    Basophil % 0.5 0.0 - 1.5 %    Immature Grans % 0.3 0.0 - 0.5 %    Neutrophils, Absolute 6.94 1.70 - 7.00 10*3/mm3    Lymphocytes, Absolute 2.17 0.70 - 3.10 10*3/mm3    Monocytes, Absolute 0.52 0.10 - 0.90 10*3/mm3    Eosinophils, Absolute 0.09 0.00 - 0.40 10*3/mm3    Basophils, Absolute 0.05 0.00 - 0.20 10*3/mm3    Immature Grans, Absolute 0.03 0.00 - 0.05 10*3/mm3    nRBC 0.0 0.0 - 0.2 /100 WBC   Magnesium    Collection Time: 11/24/23  4:20 AM    Specimen: Blood   Result Value Ref Range    Magnesium 2.3 1.6 - 2.6 mg/dL   Urinalysis With Microscopic If Indicated (No Culture) - Urine, Clean Catch    Collection Time: 11/24/23  4:21 AM     Specimen: Urine, Clean Catch   Result Value Ref Range    Color, UA Dark Yellow (A) Yellow, Straw    Appearance, UA Cloudy (A) Clear    pH, UA 6.5 5.0 - 8.0    Specific Gravity, UA 1.024 1.005 - 1.030    Glucose, UA Negative Negative    Ketones, UA 80 mg/dL (3+) (A) Negative    Bilirubin, UA Negative Negative    Blood, UA Moderate (2+) (A) Negative    Protein, UA Trace (A) Negative    Leuk Esterase, UA Trace (A) Negative    Nitrite, UA Negative Negative    Urobilinogen, UA 1.0 E.U./dL 0.2 - 1.0 E.U./dL   Urinalysis, Microscopic Only - Urine, Clean Catch    Collection Time: 11/24/23  4:21 AM    Specimen: Urine, Clean Catch   Result Value Ref Range    RBC, UA 11-20 (A) None Seen, 0-2 /HPF    WBC, UA 3-5 (A) None Seen, 0-2 /HPF    Bacteria, UA 1+ (A) None Seen /HPF    Squamous Epithelial Cells, UA 13-20 (A) None Seen, 0-2 /HPF    Hyaline Casts, UA 0-2 None Seen /LPF    Calcium Oxalate Crystals, UA Small/1+ None Seen /HPF    Methodology Manual Light Microscopy    Lactic Acid, Plasma    Collection Time: 11/24/23  8:00 AM    Specimen: Blood   Result Value Ref Range    Lactate 0.9 0.5 - 2.0 mmol/L       Ordered the above labs and independently reviewed and interpreted the results by me.        RADIOLOGY  CT Abdomen Pelvis With Contrast    Addendum Date: 11/24/2023    ADDENDUM: 11 24 23 07:28 Verify Receipt Verified receipt with  [facility]  on 11 24 07:28 (-05:00)     Result Date: 11/24/2023  CR Patient: SYDNEY NUR  Time Out: 07:22 Exam(s): CT ABDOMEN + PELVIS With Contrast EXAM:   CT Abdomen and Pelvis With Intravenous Contrast CLINICAL HISTORY:    Reason for exam: Right-sided abdominal pain. TECHNIQUE:   Axial computed tomography images of the abdomen and pelvis with intravenous contrast.  CTDI is 8.04 mGy and DLP is 396.4 mGy-cm.  This CT exam was performed according to the principle of ALARA (As Low As Reasonably Achievable) by using one or more of the following dose reduction techniques: automated  exposure control, adjustment of the mA and or kV according to patient size, and or use of iterative reconstruction technique. COMPARISON:   No relevant prior studies available. FINDINGS:   Lung bases:  Unremarkable.  No mass.  No consolidation.  ABDOMEN:   Liver:  Unremarkable.  No mass.   Gallbladder and bile ducts:  Unremarkable.  No calcified stones.  No ductal dilation.   Pancreas:  Unremarkable.  No mass.  No ductal dilation.   Spleen:  Unremarkable.  No splenomegaly.   Adrenals:  Unremarkable.  No mass.   Kidneys and ureters:  Obstructive 2 mm calculus within the right ureteropelvic junction (not visualized on  imaging) with upstream mild right renal hydronephrosis with perinephric stranding, periureteral stranding, delayed right nephrogram.  Nonobstructive calculus measuring 2 mm within the severe pole of the right kidney.  Bilateral renal cysts, too small to characterize.   Stomach and bowel:  No evidence of bowel obstruction.  Diffuse annular thickening of the colon and rectum with submucosal fat deposition.  Findings may relate to proctocolitis.  Submucosal fat deposition implies chronicity.  PELVIS:   Appendix:  No findings to suggest acute appendicitis.   Bladder:  Diffuse bladder wall thickening with mild adjacent stranding.  Findings can be seen with cystitis.  Consider correlation with laboratory values.   Reproductive:  Unremarkable as visualized.  ABDOMEN and PELVIS:   Intraperitoneal space:  Unremarkable.  No free air.  No significant fluid collection.   Bones joints:  Degenerative changes in the spine.  No acute fracture.  No dislocation.   Soft tissues:  Umbilical hernia containing fat.   Vasculature:  Unremarkable.  No abdominal aortic aneurysm.   Lymph nodes:  Unremarkable.  No enlarged lymph nodes. IMPRESSION:     1.  Obstructive 2 mm calculus within the right ureteropelvic junction (not visualized on  imaging) with upstream mild right renal hydronephrosis with perinephric stranding,  periureteral stranding, delayed right nephrogram. 2.  Nonobstructive calculus measuring 2 mm within the severe pole of the right kidney. 3.  Diffuse annular thickening of the colon and rectum with submucosal fat deposition.  Findings may relate to proctocolitis.  Submucosal fat deposition implies chronicity. 4.  Diffuse bladder wall thickening with mild adjacent stranding.  Findings can be seen with cystitis.  Consider correlation with laboratory values. 5.  No other acute findings. 6.  Incidental findings as described.     Communications:  Verify Receipt Electronically signed by Allen Rivera MD on 11-24-23 at 0722     I ordered the above noted radiological studies.  These were independently interpreted and reviewed by me.  See dictation for official radiology interpretation.      PROCEDURES    Procedures      MEDICATIONS GIVEN IN ER    Medications   sodium chloride 0.9 % bolus 1,000 mL (0 mL Intravenous Stopped 11/24/23 0758)   ondansetron (ZOFRAN) injection 4 mg (4 mg Intravenous Given 11/24/23 0425)   morphine injection 4 mg (4 mg Intravenous Given 11/24/23 0427)   HYDROmorphone (DILAUDID) injection 0.5 mg (0.5 mg Intravenous Given 11/24/23 0520)   iopamidol (ISOVUE-300) 61 % injection 85 mL (85 mL Intravenous Given 11/24/23 0524)   ketorolac (TORADOL) injection 15 mg (15 mg Intravenous Given 11/24/23 0609)   cefTRIAXone (ROCEPHIN) 2,000 mg in sodium chloride 0.9 % 100 mL IVPB-VTB (2,000 mg Intravenous New Bag 11/24/23 0935)         PROGRESS, DATA ANALYSIS, CONSULTS, AND MEDICAL DECISION MAKING    All labs have been independently reviewed by me.  All radiology studies have been reviewed by me and discussed with radiologist dictating the report.   EKG's independently viewed and interpreted by me.  Discussion below represents my analysis of pertinent findings related to patient's condition, differential diagnosis, treatment plan and final disposition.    DDx:  Includes, but is not limited to appendicitis,  gastroenteritis, food poisoning, UTI, ureterolithiasis, pyelonephritis, diverticulitis, ectopic pregnancy, ovarian torsion, ovarian cyst    After my initial assessment I am concerned about appendicitis and patient may need hospitalization due to surgical consultation.    ED Course as of 11/24/23 2010 Fri Nov 24, 2023   0453 WBC: 9.80 [GIO]   0453 Hemoglobin: 14.3 [GIO]   0453 Hematocrit: 42.1 [GIO]   0453 Platelets: 276 [GIO]   0453 Glucose: 97 [GIO]   0453 BUN: 12 [GIO]   0453 Creatinine: 0.84 [GIO]   0453 Sodium: 137 [GIO]   0453 Potassium(!): 3.2 [GIO]   0453 Chloride: 99 [GIO]   0453 CO2: 23.6 [GIO]   0454 Lipase: 41 [GIO]   0454 Ketones, UA(!): 80 mg/dL (3+) [GIO]   0508 RBC, UA(!): 11-20 [GIO]   0508 WBC, UA(!): 3-5 [GIO]   0508 Bacteria, UA(!): 1+ [GIO]   0508 Squamous Epithelial Cells, UA(!): 13-20 [GIO]   0511 HCG Qualitative: Negative [GIO]   0529 CT abdomen pelvis interpreted myself:  Right hydroureteronephrosis with associated perinephric stranding and bladder wall thickening concerning for urinary tract infection.  Possible small stone at the UVJ [FS]   0600 Patient history, ER presentation and evaluation discussed with and patient care turned over to ALTHEA Zavala PA-C. [GIO]   0773 Patient reassessed at this time.  She continues with some mild discomfort although significantly improved.  She does have some evidence of bacteriuria and an obstructive stone on her CT imaging.  With evidence of cystitis I feel that she would most appropriately be managed with IV antibiotics, urologic consultation, continued pain control in the observation unit.  Patient agreeable with this plan and all questions answered. [DC]      ED Course User Index  [DC] Morris Zavala PA  [FS] Dallas Alberto MD  [GIO] Trey Arellano PA       PPE:  The patient wore a mask and I wore a mask and all appropriate PPE throughout the entire patient encounter.      AS OF 20:10 EST VITALS:    BP - 105/69  HR - 75  TEMP - 97.5 °F (36.4 °C) (Oral)  O2 SATS -  98%      DIAGNOSIS  Final diagnoses:   Right flank pain   Nausea, vomiting, and diarrhea   Hydronephrosis of right kidney   Renal colic   Acute UTI (urinary tract infection)   Rectum inflammation   Diarrhea, unspecified type   Ureteral stone         DISPOSITION  ADMISSION    Discussed treatment plan and reason for admission with pt/family and admitting physician.  Pt/family voiced understanding of the plan for admission for further testing/treatment as needed.       Note Disclaimer: At [facility], we believe that sharing information builds trust and better relationships. You are receiving this note because you recently visited [facility]. It is possible you will see health information before a provider has talked with you about it. This kind of information can be easy to misunderstand. To help you fully understand what it means for your health, we urge you to discuss this note with your provider.       Trey Arellano PA  11/24/23 2011

## 2023-11-24 NOTE — PROGRESS NOTES
MD ATTESTATION NOTE    The BORA and I have discussed this patient's history, physical exam, and treatment plan.  I have reviewed the documentation and personally had a face to face interaction with the patient. I affirm the documentation and agree with the treatment and plan.  The attached note describes my personal findings.      I provided a substantive portion of the care of the patient.  I personally performed the physical exam in its entirety, and below are my findings.  For this patient encounter, the patient wore surgical mask, I wore full protective PPE including N95 and eye protection.      Brief HPI: Patient presents for evaluation of nausea vomiting diarrhea as well as right-sided abdominal pain.  Patient states she felt well yesterday.  Started in the evening.  Patient gradually got to the point of not being able to find a position of comfort.  Patient presented to the emergency department was given symptomatic treatment.  Had CT scan that shows 2 mm stone on the right.  Patient also has some possible colitis as well.  Patient admitted to the observation unit for further evaluation management.  Urology has been consulted in the emergency department they felt patient would not be a surgical candidate.    PHYSICAL EXAM  ED Triage Vitals   Temp Heart Rate Resp BP SpO2   11/24/23 0343 11/24/23 0343 11/24/23 0343 11/24/23 0348 11/24/23 0343   98.2 °F (36.8 °C) 66 16 162/98 98 %      Temp src Heart Rate Source Patient Position BP Location FiO2 (%)   11/24/23 0343 11/24/23 0343 11/24/23 0348 11/24/23 0348 --   Tympanic Monitor Sitting Right arm          GENERAL: no acute distress  HENT: nares patent  EYES: no scleral icterus  CV: regular rhythm, normal rate  RESPIRATORY: normal effort  ABDOMEN: soft  MUSCULOSKELETAL: no deformity  NEURO: alert, moves all extremities, follows commands  PSYCH:  calm, cooperative  SKIN: warm, dry    Vital signs and nursing notes reviewed.        Plan: Admit to observation unit.   Pain control.  Antibiotics.  Follow cultures

## 2023-11-29 LAB
BACTERIA SPEC AEROBE CULT: NORMAL
BACTERIA SPEC AEROBE CULT: NORMAL

## 2024-03-12 ENCOUNTER — HOSPITAL ENCOUNTER (EMERGENCY)
Facility: HOSPITAL | Age: 34
Discharge: HOME | End: 2024-03-12
Attending: EMERGENCY MEDICINE | Admitting: EMERGENCY MEDICINE
Payer: COMMERCIAL

## 2024-03-12 ENCOUNTER — APPOINTMENT (OUTPATIENT)
Dept: CT IMAGING | Facility: HOSPITAL | Age: 34
End: 2024-03-12
Payer: COMMERCIAL

## 2024-03-12 VITALS
TEMPERATURE: 98 F | HEIGHT: 64 IN | DIASTOLIC BLOOD PRESSURE: 79 MMHG | RESPIRATION RATE: 18 BRPM | OXYGEN SATURATION: 97 % | SYSTOLIC BLOOD PRESSURE: 115 MMHG | BODY MASS INDEX: 23.9 KG/M2 | WEIGHT: 140 LBS | HEART RATE: 81 BPM

## 2024-03-12 DIAGNOSIS — R10.9 ACUTE ABDOMINAL PAIN: Primary | ICD-10-CM

## 2024-03-12 DIAGNOSIS — R11.2 NAUSEA AND VOMITING, UNSPECIFIED VOMITING TYPE: ICD-10-CM

## 2024-03-12 DIAGNOSIS — E86.0 DEHYDRATION: ICD-10-CM

## 2024-03-12 LAB
ALBUMIN SERPL-MCNC: 5 G/DL (ref 3.5–5.2)
ALBUMIN/GLOB SERPL: 1.8 G/DL
ALP SERPL-CCNC: 50 U/L (ref 39–117)
ALT SERPL W P-5'-P-CCNC: 49 U/L (ref 1–33)
ANION GAP SERPL CALCULATED.3IONS-SCNC: 18 MMOL/L (ref 5–15)
AST SERPL-CCNC: 51 U/L (ref 1–32)
BACTERIA UR QL AUTO: ABNORMAL /HPF
BASOPHILS # BLD AUTO: 0.05 10*3/MM3 (ref 0–0.2)
BASOPHILS NFR BLD AUTO: 0.6 % (ref 0–1.5)
BILIRUB SERPL-MCNC: 1.9 MG/DL (ref 0–1.2)
BILIRUB UR QL STRIP: NEGATIVE
BUN SERPL-MCNC: 11 MG/DL (ref 6–20)
BUN/CREAT SERPL: 14.7 (ref 7–25)
CALCIUM SPEC-SCNC: 10.1 MG/DL (ref 8.6–10.5)
CHLORIDE SERPL-SCNC: 99 MMOL/L (ref 98–107)
CLARITY UR: ABNORMAL
CO2 SERPL-SCNC: 19 MMOL/L (ref 22–29)
COLOR UR: YELLOW
CREAT SERPL-MCNC: 0.75 MG/DL (ref 0.57–1)
DEPRECATED RDW RBC AUTO: 42.4 FL (ref 37–54)
EGFRCR SERPLBLD CKD-EPI 2021: 109.3 ML/MIN/1.73
EOSINOPHIL # BLD AUTO: 0.02 10*3/MM3 (ref 0–0.4)
EOSINOPHIL NFR BLD AUTO: 0.3 % (ref 0.3–6.2)
ERYTHROCYTE [DISTWIDTH] IN BLOOD BY AUTOMATED COUNT: 12.4 % (ref 12.3–15.4)
GLOBULIN UR ELPH-MCNC: 2.8 GM/DL
GLUCOSE SERPL-MCNC: 80 MG/DL (ref 65–99)
GLUCOSE UR STRIP-MCNC: NEGATIVE MG/DL
HCG SERPL QL: NEGATIVE
HCT VFR BLD AUTO: 51.3 % (ref 34–46.6)
HGB BLD-MCNC: 17.2 G/DL (ref 12–15.9)
HGB UR QL STRIP.AUTO: NEGATIVE
HOLD SPECIMEN: NORMAL
HYALINE CASTS UR QL AUTO: ABNORMAL /LPF
IMM GRANULOCYTES # BLD AUTO: 0.02 10*3/MM3 (ref 0–0.05)
IMM GRANULOCYTES NFR BLD AUTO: 0.3 % (ref 0–0.5)
KETONES UR QL STRIP: ABNORMAL
LEUKOCYTE ESTERASE UR QL STRIP.AUTO: NEGATIVE
LIPASE SERPL-CCNC: 23 U/L (ref 13–60)
LYMPHOCYTES # BLD AUTO: 2.53 10*3/MM3 (ref 0.7–3.1)
LYMPHOCYTES NFR BLD AUTO: 31.6 % (ref 19.6–45.3)
MCH RBC QN AUTO: 30.7 PG (ref 26.6–33)
MCHC RBC AUTO-ENTMCNC: 33.5 G/DL (ref 31.5–35.7)
MCV RBC AUTO: 91.6 FL (ref 79–97)
MONOCYTES # BLD AUTO: 0.38 10*3/MM3 (ref 0.1–0.9)
MONOCYTES NFR BLD AUTO: 4.8 % (ref 5–12)
NEUTROPHILS NFR BLD AUTO: 5 10*3/MM3 (ref 1.7–7)
NEUTROPHILS NFR BLD AUTO: 62.4 % (ref 42.7–76)
NITRITE UR QL STRIP: NEGATIVE
NRBC BLD AUTO-RTO: 0 /100 WBC (ref 0–0.2)
PH UR STRIP.AUTO: 6 [PH] (ref 5–8)
PLATELET # BLD AUTO: 299 10*3/MM3 (ref 140–450)
PMV BLD AUTO: 10.7 FL (ref 6–12)
POTASSIUM SERPL-SCNC: 3.9 MMOL/L (ref 3.5–5.2)
PROT SERPL-MCNC: 7.8 G/DL (ref 6–8.5)
PROT UR QL STRIP: ABNORMAL
RBC # BLD AUTO: 5.6 10*6/MM3 (ref 3.77–5.28)
RBC # UR STRIP: ABNORMAL /HPF
REF LAB TEST METHOD: ABNORMAL
SODIUM SERPL-SCNC: 136 MMOL/L (ref 136–145)
SP GR UR STRIP: 1.03 (ref 1–1.03)
SQUAMOUS #/AREA URNS HPF: ABNORMAL /HPF
UROBILINOGEN UR QL STRIP: ABNORMAL
WBC # UR STRIP: ABNORMAL /HPF
WBC NRBC COR # BLD AUTO: 8 10*3/MM3 (ref 3.4–10.8)
WHOLE BLOOD HOLD COAG: NORMAL
WHOLE BLOOD HOLD SPECIMEN: NORMAL

## 2024-03-12 PROCEDURE — 85025 COMPLETE CBC W/AUTO DIFF WBC: CPT

## 2024-03-12 PROCEDURE — 96374 THER/PROPH/DIAG INJ IV PUSH: CPT

## 2024-03-12 PROCEDURE — 25010000002 ONDANSETRON PER 1 MG: Performed by: EMERGENCY MEDICINE

## 2024-03-12 PROCEDURE — 83690 ASSAY OF LIPASE: CPT | Performed by: EMERGENCY MEDICINE

## 2024-03-12 PROCEDURE — 84703 CHORIONIC GONADOTROPIN ASSAY: CPT | Performed by: EMERGENCY MEDICINE

## 2024-03-12 PROCEDURE — 99284 EMERGENCY DEPT VISIT MOD MDM: CPT

## 2024-03-12 PROCEDURE — 80053 COMPREHEN METABOLIC PANEL: CPT | Performed by: EMERGENCY MEDICINE

## 2024-03-12 PROCEDURE — 25810000003 SODIUM CHLORIDE 0.9 % SOLUTION: Performed by: EMERGENCY MEDICINE

## 2024-03-12 PROCEDURE — 25010000002 HYDROMORPHONE PER 4 MG: Performed by: EMERGENCY MEDICINE

## 2024-03-12 PROCEDURE — 96375 TX/PRO/DX INJ NEW DRUG ADDON: CPT

## 2024-03-12 PROCEDURE — 81001 URINALYSIS AUTO W/SCOPE: CPT | Performed by: EMERGENCY MEDICINE

## 2024-03-12 PROCEDURE — 74176 CT ABD & PELVIS W/O CONTRAST: CPT

## 2024-03-12 RX ORDER — LISDEXAMFETAMINE DIMESYLATE CAPSULES 30 MG/1
30 CAPSULE ORAL DAILY
COMMUNITY
Start: 2024-03-07 | End: 2024-04-06

## 2024-03-12 RX ORDER — ONDANSETRON 2 MG/ML
4 INJECTION INTRAMUSCULAR; INTRAVENOUS ONCE
Status: COMPLETED | OUTPATIENT
Start: 2024-03-12 | End: 2024-03-12

## 2024-03-12 RX ORDER — PROMETHAZINE HYDROCHLORIDE 25 MG/1
25 TABLET ORAL EVERY 6 HOURS PRN
Qty: 12 TABLET | Refills: 0 | Status: SHIPPED | OUTPATIENT
Start: 2024-03-12

## 2024-03-12 RX ORDER — HYDROMORPHONE HYDROCHLORIDE 1 MG/ML
0.25 INJECTION, SOLUTION INTRAMUSCULAR; INTRAVENOUS; SUBCUTANEOUS ONCE
Status: COMPLETED | OUTPATIENT
Start: 2024-03-12 | End: 2024-03-12

## 2024-03-12 RX ORDER — ONDANSETRON 4 MG/1
4 TABLET, ORALLY DISINTEGRATING ORAL 4 TIMES DAILY PRN
Qty: 12 TABLET | Refills: 0 | Status: SHIPPED | OUTPATIENT
Start: 2024-03-12

## 2024-03-12 RX ORDER — SODIUM CHLORIDE 0.9 % (FLUSH) 0.9 %
10 SYRINGE (ML) INJECTION AS NEEDED
Status: DISCONTINUED | OUTPATIENT
Start: 2024-03-12 | End: 2024-03-12 | Stop reason: HOSPADM

## 2024-03-12 RX ADMIN — SODIUM CHLORIDE 1000 ML: 9 INJECTION, SOLUTION INTRAVENOUS at 10:36

## 2024-03-12 RX ADMIN — HYDROMORPHONE HYDROCHLORIDE 0.25 MG: 1 INJECTION, SOLUTION INTRAMUSCULAR; INTRAVENOUS; SUBCUTANEOUS at 10:38

## 2024-03-12 RX ADMIN — ONDANSETRON 4 MG: 2 INJECTION INTRAMUSCULAR; INTRAVENOUS at 10:37

## 2024-03-12 NOTE — DISCHARGE INSTRUCTIONS
Use Zofran for any mild to moderate nausea or vomiting.  Use the Phenergan as needed for more severe nausea or vomiting.  Phenergan can make you drowsy where Zofran does not tend to cause this problem.  Return immediately for any fevers over 100.4, worsening abdominal pain, uncontrolled nausea or vomiting, or passing out.  Follow-up with your primary care doctor for further evaluation.

## 2024-03-12 NOTE — ED PROVIDER NOTES
EMERGENCY DEPARTMENT ENCOUNTER  Room Number:  33/33  PCP: Lidia Pickard APRN  Independent Historians: Patient and Family      HPI:  Chief Complaint: had concerns including Flank Pain.     A complete HPI/ROS/PMH/PSH/SH/FH are unobtainable due to: None    Chronic or social conditions impacting patient care (Social Determinants of Health): None      Context: Soumya Stout is a 33 y.o. female with a medical history of kidney stones who presents to the ED c/o acute nausea and vomiting.  The patient reports for the last 2 days she has had nausea and vomiting.  She states that yesterday and today she has had persistent worsened nausea vomiting as well as right lower quadrant abdominal pain.  She states it feels like prior kidney stones.  She denies any dysuria or hematuria.  She states that she has not taken any medicine for her symptoms.  She states getting up and walking around seems to make her nausea worse.  She has not ever had any surgeries on her abdomen.  She states the pain is moderate to severe.  She reports the nausea is severe.  She denies any vaginal bleeding.      Review of prior external notes (non-ED) -and- Review of prior external test results outside of this encounter:  Stacie evaluation 2023 shows normal CMP, normal CBC, negative hCG    Prescription drug monitoring program review:         PAST MEDICAL HISTORY  Active Ambulatory Problems     Diagnosis Date Noted    Ureteral stone 2023     Resolved Ambulatory Problems     Diagnosis Date Noted    No Resolved Ambulatory Problems     Past Medical History:   Diagnosis Date    ADHD (attention deficit hyperactivity disorder)     Asthma          PAST SURGICAL HISTORY  Past Surgical History:   Procedure Laterality Date    INDUCED            FAMILY HISTORY  Family History   Problem Relation Age of Onset    Hypertension Mother     No Known Problems Father          SOCIAL HISTORY  Social History     Socioeconomic History    Marital  status: Significant Other   Tobacco Use    Smoking status: Never    Smokeless tobacco: Never   Vaping Use    Vaping status: Never Used   Substance and Sexual Activity    Alcohol use: Yes     Alcohol/week: 2.0 standard drinks of alcohol     Types: 2 Glasses of wine per week     Comment: SOCIALLY (WEEKENDS)    Drug use: Never    Sexual activity: Defer         ALLERGIES  Codeine and Penicillins      REVIEW OF SYSTEMS  Review of Systems  Included in HPI  All systems reviewed and negative except for those discussed in HPI.      PHYSICAL EXAM    I have reviewed the triage vital signs and nursing notes.    ED Triage Vitals   Temp Heart Rate Resp BP SpO2   03/12/24 0946 03/12/24 0946 03/12/24 0946 03/12/24 0947 03/12/24 0946   98 °F (36.7 °C) (!) 138 18 137/97 99 %      Temp src Heart Rate Source Patient Position BP Location FiO2 (%)   -- -- -- -- --              Physical Exam  GENERAL: Awake, alert, no acute distress  SKIN: Warm, dry  HENT: Normocephalic, atraumatic  EYES: no scleral icterus  CV: regular rhythm, tachycardic rate  RESPIRATORY: normal effort, lungs clear  ABDOMEN: soft, mild tenderness in the right lower quadrant, nondistended  MUSCULOSKELETAL: no deformity  NEURO: alert, moves all extremities, follows commands            LAB RESULTS  Recent Results (from the past 24 hour(s))   Comprehensive Metabolic Panel    Collection Time: 03/12/24  9:56 AM    Specimen: Blood   Result Value Ref Range    Glucose 80 65 - 99 mg/dL    BUN 11 6 - 20 mg/dL    Creatinine 0.75 0.57 - 1.00 mg/dL    Sodium 136 136 - 145 mmol/L    Potassium 3.9 3.5 - 5.2 mmol/L    Chloride 99 98 - 107 mmol/L    CO2 19.0 (L) 22.0 - 29.0 mmol/L    Calcium 10.1 8.6 - 10.5 mg/dL    Total Protein 7.8 6.0 - 8.5 g/dL    Albumin 5.0 3.5 - 5.2 g/dL    ALT (SGPT) 49 (H) 1 - 33 U/L    AST (SGOT) 51 (H) 1 - 32 U/L    Alkaline Phosphatase 50 39 - 117 U/L    Total Bilirubin 1.9 (H) 0.0 - 1.2 mg/dL    Globulin 2.8 gm/dL    A/G Ratio 1.8 g/dL    BUN/Creatinine  Ratio 14.7 7.0 - 25.0    Anion Gap 18.0 (H) 5.0 - 15.0 mmol/L    eGFR 109.3 >60.0 mL/min/1.73   Lipase    Collection Time: 03/12/24  9:56 AM    Specimen: Blood   Result Value Ref Range    Lipase 23 13 - 60 U/L   hCG, Serum, Qualitative    Collection Time: 03/12/24  9:56 AM    Specimen: Blood   Result Value Ref Range    HCG Qualitative Negative Negative   Green Top (Gel)    Collection Time: 03/12/24  9:56 AM   Result Value Ref Range    Extra Tube Hold for add-ons.    Lavender Top    Collection Time: 03/12/24  9:56 AM   Result Value Ref Range    Extra Tube hold for add-on    Light Blue Top    Collection Time: 03/12/24  9:56 AM   Result Value Ref Range    Extra Tube Hold for add-ons.    CBC Auto Differential    Collection Time: 03/12/24  9:56 AM    Specimen: Blood   Result Value Ref Range    WBC 8.00 3.40 - 10.80 10*3/mm3    RBC 5.60 (H) 3.77 - 5.28 10*6/mm3    Hemoglobin 17.2 (H) 12.0 - 15.9 g/dL    Hematocrit 51.3 (H) 34.0 - 46.6 %    MCV 91.6 79.0 - 97.0 fL    MCH 30.7 26.6 - 33.0 pg    MCHC 33.5 31.5 - 35.7 g/dL    RDW 12.4 12.3 - 15.4 %    RDW-SD 42.4 37.0 - 54.0 fl    MPV 10.7 6.0 - 12.0 fL    Platelets 299 140 - 450 10*3/mm3    Neutrophil % 62.4 42.7 - 76.0 %    Lymphocyte % 31.6 19.6 - 45.3 %    Monocyte % 4.8 (L) 5.0 - 12.0 %    Eosinophil % 0.3 0.3 - 6.2 %    Basophil % 0.6 0.0 - 1.5 %    Immature Grans % 0.3 0.0 - 0.5 %    Neutrophils, Absolute 5.00 1.70 - 7.00 10*3/mm3    Lymphocytes, Absolute 2.53 0.70 - 3.10 10*3/mm3    Monocytes, Absolute 0.38 0.10 - 0.90 10*3/mm3    Eosinophils, Absolute 0.02 0.00 - 0.40 10*3/mm3    Basophils, Absolute 0.05 0.00 - 0.20 10*3/mm3    Immature Grans, Absolute 0.02 0.00 - 0.05 10*3/mm3    nRBC 0.0 0.0 - 0.2 /100 WBC   Urinalysis With Microscopic If Indicated (No Culture) - Urine, Clean Catch    Collection Time: 03/12/24  9:57 AM    Specimen: Urine, Clean Catch   Result Value Ref Range    Color, UA Yellow Yellow, Straw    Appearance, UA Cloudy (A) Clear    pH, UA 6.0 5.0 -  8.0    Specific Gravity, UA 1.026 1.005 - 1.030    Glucose, UA Negative Negative    Ketones, UA 80 mg/dL (3+) (A) Negative    Bilirubin, UA Negative Negative    Blood, UA Negative Negative    Protein, UA 30 mg/dL (1+) (A) Negative    Leuk Esterase, UA Negative Negative    Nitrite, UA Negative Negative    Urobilinogen, UA 1.0 E.U./dL 0.2 - 1.0 E.U./dL   Urinalysis, Microscopic Only - Urine, Clean Catch    Collection Time: 03/12/24  9:57 AM    Specimen: Urine, Clean Catch   Result Value Ref Range    RBC, UA None Seen None Seen, 0-2 /HPF    WBC, UA 6-10 (A) None Seen, 0-2 /HPF    Bacteria, UA 4+ (A) None Seen /HPF    Squamous Epithelial Cells, UA 13-20 (A) None Seen, 0-2 /HPF    Hyaline Casts, UA 21-30 None Seen /LPF    Methodology Manual Light Microscopy          RADIOLOGY  CT Abdomen Pelvis Without Contrast    Result Date: 3/12/2024  CT ABDOMEN PELVIS WO CONTRAST-  INDICATION: Right lower quadrant pain  COMPARISON: CT abdomen pelvis November 24, 2023  TECHNIQUE: Routine CT abdomen and pelvis without IV contrast. Coronal and sagittal reformats. Radiation dose reduction techniques were utilized, including automated exposure control and exposure modulation based on body size.  FINDINGS:  Lung bases: Unremarkable.  ABDOMEN: Normal liver, gallbladder, spleen, pancreas and adrenal glands. Small fluid attenuating cyst seen in the right mid kidney. Nonobstructing nephrolithiasis in the right mid kidney, series 2, axial mage 48, measures 2 mm. Punctate nonobstructing nephrolithiasis in the left mid kidney, series 2, axial mage 42, measures 1 mm. No hydronephrosis or ureterolithiasis. Suspect small cysts in the left kidney.  Pelvis: Bladder is collapsed. No bladder calculus. Anteverted uterus. Dominant follicle in the right ovary. Phleboliths in the pelvis.  Bowel: No obstruction. Colonic diverticulosis. Appendix not identified though no secondary findings of appendicitis.  Abdominal wall: Unremarkable.  Retroperitoneum: No  lymphadenopathy.  Vasculature: No abdominal aortic aneurysm.  Osseous structures: Mild disc height loss at L5/S1.       1. No acute findings identified in the abdomen or pelvis. 2. Nonobstructing nephrolithiasis. 3. Colonic diverticulosis.  This report was finalized on 3/12/2024 11:01 AM by Dr. Gagan Irwin M.D on Workstation: [id number]         MEDICATIONS GIVEN IN ER  Medications   sodium chloride 0.9 % flush 10 mL (has no administration in time range)   sodium chloride 0.9 % bolus 1,000 mL (1,000 mL Intravenous New Bag 3/12/24 1036)   ondansetron (ZOFRAN) injection 4 mg (4 mg Intravenous Given 3/12/24 1037)   HYDROmorphone (DILAUDID) injection 0.25 mg (0.25 mg Intravenous Given 3/12/24 1038)         ORDERS PLACED DURING THIS VISIT:  Orders Placed This Encounter   Procedures    CT Abdomen Pelvis Without Contrast    [place] Draw    Comprehensive Metabolic Panel    Lipase    Urinalysis With Microscopic If Indicated (No Culture) - Urine, Clean Catch    hCG, Serum, Qualitative    CBC Auto Differential    Urinalysis, Microscopic Only - Urine, Clean Catch    NPO Diet NPO Type: Strict NPO    Undress & Gown    Insert Peripheral IV    CBC & Differential    Green Top (Gel)    Lavender Top    Light Blue Top         OUTPATIENT MEDICATION MANAGEMENT:  Current Facility-Administered Medications Ordered in Epic   Medication Dose Route Frequency Provider Last Rate Last Admin    sodium chloride 0.9 % flush 10 mL  10 mL Intravenous PRN Johnathan Aly MD         Current Outpatient Medications Ordered in Epic   Medication Sig Dispense Refill    albuterol (ACCUNEB) 1.25 MG/3ML nebulizer solution Take 3 mL by nebulization Every 6 (Six) Hours As Needed for Wheezing. 60 each 0    albuterol sulfate  (90 Base) MCG/ACT inhaler       Etonogestrel (NEXPLANON) 68 MG implant subdermal implant Inject 1 each into the appropriate area of the skin as directed by provider 1 (One) Time.      ipratropium (ATROVENT) 0.02 % nebulizer  solution Take 2.5 mL by nebulization 4 (Four) Times a Day. 150 mL 0    lisdexamfetamine (VYVANSE) 30 MG capsule Take 1 capsule by mouth Daily      melatonin 1 MG tablet Take  by mouth.      doxycycline (MONODOX) 100 MG capsule Take 1 capsule by mouth 2 (Two) Times a Day. 20 capsule 0    ondansetron (ZOFRAN) 4 MG tablet Take 1 tablet by mouth Every 6 (Six) Hours As Needed for Nausea or Vomiting. 15 tablet 0    ondansetron ODT (ZOFRAN-ODT) 4 MG disintegrating tablet Place 1 tablet on the tongue 4 (Four) Times a Day As Needed for Nausea. 12 tablet 0    phentermine (ADIPEX-P) 37.5 MG tablet phentermine 37.5 mg tablet      predniSONE (DELTASONE) 50 MG tablet Take 1 tablet by mouth Daily. 5 tablet 0    promethazine (PHENERGAN) 25 MG tablet Take 1 tablet by mouth Every 6 (Six) Hours As Needed for Nausea or Vomiting. 12 tablet 0    tamsulosin (FLOMAX) 0.4 MG capsule 24 hr capsule Take 1 capsule by mouth Daily. 7 capsule 0         PROCEDURES  Procedures            PROGRESS, DATA ANALYSIS, CONSULTS, AND MEDICAL DECISION MAKING  All labs have been independently interpreted by me.  All radiology studies have been reviewed by me. All EKG's have been independently viewed and interpreted by me.  Discussion below represents my analysis of pertinent findings related to patient's condition, differential diagnosis, treatment plan and final disposition.    Differential diagnosis includes but is not limited to kidney stone, UTI, pyelonephritis, appendicitis, dehydration, renal failure.    Clinical Scores:                   ED Course as of 03/12/24 1139   Tue Mar 12, 2024   1036 WBC, UA(!): 6-10 [TR]   1036 Bacteria, UA(!): 4+ [TR]   1036 Squamous Epithelial Cells, UA(!): 13-20 [TR]   1036 Ketones, UA(!): 80 mg/dL (3+) [TR]   1049 CT Abdomen Pelvis Without Contrast  My independent interpretation of the imaging study is no hydronephrosis [TR]   1136 Updated the patient and family at the bedside.  Answered all questions.  Her nausea has  resolved.  She did have evidence of dehydration with greater than 80 ketones in her urine.  Her urinalysis appears contaminated with 13-20 squamous cells.  She has no urinary symptoms to suggest UTI.  Plan discharge home with antiemetics.  Her CT of the abdomen pelvis shows no acute process.  She is agreeable. [TR]      ED Course User Index  [TR] Johnathan Aly MD             AS OF 11:39 EDT VITALS:    BP - 115/79  HR - 81  TEMP - 98 °F (36.7 °C)  O2 SATS - 97%    COMPLEXITY OF CARE  Admission was considered but after careful review of the patient's presentation, physical examination, diagnostic results, and response to treatment the patient may be safely discharged with outpatient follow-up.      DIAGNOSIS  Final diagnoses:   Acute abdominal pain   Nausea and vomiting, unspecified vomiting type   Dehydration         DISPOSITION  ED Disposition       ED Disposition   Discharge    Condition   Stable    Comment   --                Please note that portions of this document were completed with a voice recognition program.    Note Disclaimer: At [facility], we believe that sharing information builds trust and better relationships. You are receiving this note because you recently visited [facility]. It is possible you will see health information before a provider has talked with you about it. This kind of information can be easy to misunderstand. To help you fully understand what it means for your health, we urge you to discuss this note with your provider.         Johnathan Aly MD  03/12/24 2192

## 2024-12-06 ENCOUNTER — HOSPITAL ENCOUNTER (EMERGENCY)
Facility: HOSPITAL | Age: 34
Discharge: LEFT BEFORE DISCHARGE | End: 2024-12-06
Payer: COMMERCIAL

## 2024-12-06 VITALS
HEIGHT: 64 IN | DIASTOLIC BLOOD PRESSURE: 116 MMHG | RESPIRATION RATE: 18 BRPM | WEIGHT: 135 LBS | BODY MASS INDEX: 23.05 KG/M2 | TEMPERATURE: 96.9 F | OXYGEN SATURATION: 100 % | HEART RATE: 64 BPM | SYSTOLIC BLOOD PRESSURE: 169 MMHG

## 2024-12-06 LAB
ALBUMIN SERPL-MCNC: 4.6 G/DL (ref 3.5–5.2)
ALBUMIN/GLOB SERPL: 1.6 G/DL
ALP SERPL-CCNC: 43 U/L (ref 39–117)
ALT SERPL W P-5'-P-CCNC: 40 U/L (ref 1–33)
ANION GAP SERPL CALCULATED.3IONS-SCNC: 16.5 MMOL/L (ref 5–15)
AST SERPL-CCNC: 38 U/L (ref 1–32)
BACTERIA UR QL AUTO: ABNORMAL /HPF
BILIRUB SERPL-MCNC: 1.7 MG/DL (ref 0–1.2)
BILIRUB UR QL STRIP: NEGATIVE
BUN SERPL-MCNC: 14 MG/DL (ref 6–20)
BUN/CREAT SERPL: 19.7 (ref 7–25)
CALCIUM SPEC-SCNC: 9.4 MG/DL (ref 8.6–10.5)
CHLORIDE SERPL-SCNC: 97 MMOL/L (ref 98–107)
CLARITY UR: ABNORMAL
CO2 SERPL-SCNC: 22.5 MMOL/L (ref 22–29)
COLOR UR: ABNORMAL
CREAT SERPL-MCNC: 0.71 MG/DL (ref 0.57–1)
EGFRCR SERPLBLD CKD-EPI 2021: 116 ML/MIN/1.73
GLOBULIN UR ELPH-MCNC: 2.8 GM/DL
GLUCOSE SERPL-MCNC: 91 MG/DL (ref 65–99)
GLUCOSE UR STRIP-MCNC: NEGATIVE MG/DL
HCG SERPL QL: NEGATIVE
HGB UR QL STRIP.AUTO: ABNORMAL
HOLD SPECIMEN: NORMAL
HYALINE CASTS UR QL AUTO: ABNORMAL /LPF
KETONES UR QL STRIP: ABNORMAL
LEUKOCYTE ESTERASE UR QL STRIP.AUTO: ABNORMAL
LIPASE SERPL-CCNC: 28 U/L (ref 13–60)
NITRITE UR QL STRIP: NEGATIVE
PH UR STRIP.AUTO: 5.5 [PH] (ref 5–8)
POTASSIUM SERPL-SCNC: 4 MMOL/L (ref 3.5–5.2)
PROT SERPL-MCNC: 7.4 G/DL (ref 6–8.5)
PROT UR QL STRIP: ABNORMAL
RBC # UR STRIP: ABNORMAL /HPF
REF LAB TEST METHOD: ABNORMAL
SODIUM SERPL-SCNC: 136 MMOL/L (ref 136–145)
SP GR UR STRIP: 1.03 (ref 1–1.03)
SQUAMOUS #/AREA URNS HPF: ABNORMAL /HPF
UROBILINOGEN UR QL STRIP: ABNORMAL
WBC # UR STRIP: ABNORMAL /HPF
WHOLE BLOOD HOLD COAG: NORMAL
WHOLE BLOOD HOLD SPECIMEN: NORMAL
YEAST URNS QL MICRO: PRESENT /HPF

## 2024-12-06 PROCEDURE — 80053 COMPREHEN METABOLIC PANEL: CPT

## 2024-12-06 PROCEDURE — 81001 URINALYSIS AUTO W/SCOPE: CPT

## 2024-12-06 PROCEDURE — 99211 OFF/OP EST MAY X REQ PHY/QHP: CPT

## 2024-12-06 PROCEDURE — 84703 CHORIONIC GONADOTROPIN ASSAY: CPT

## 2024-12-06 PROCEDURE — 83690 ASSAY OF LIPASE: CPT

## 2024-12-06 RX ORDER — SODIUM CHLORIDE 0.9 % (FLUSH) 0.9 %
10 SYRINGE (ML) INJECTION AS NEEDED
Status: DISCONTINUED | OUTPATIENT
Start: 2024-12-06 | End: 2024-12-06 | Stop reason: HOSPADM

## 2024-12-07 NOTE — ED NOTES
Patient ambulatory to ED right sided flank/ abdominal pain since 1630 this evening. Patient states she has ha some diarrhea and vomiting. Patient stated she feels like she is having incomplete bladder emptying x1 hour.

## 2025-07-12 ENCOUNTER — PATIENT ROUNDING (BHMG ONLY) (OUTPATIENT)
Dept: URGENT CARE | Facility: CLINIC | Age: 35
End: 2025-07-12
Payer: COMMERCIAL

## 2025-07-13 NOTE — ED NOTES
Thank you for letting us care for you in your recent visit to our urgent care center. We would love to hear about your experience with us. Was this the first time you have visited our location?    We’re always looking for ways to make our patients’ experiences even better. Do you have any recommendations on ways we may improve?     I appreciate you taking the time to respond. Please be on the lookout for a survey about your recent visit from [employer] via text or email. We would greatly appreciate if you could fill that out and turn it back in. We want your voice to be heard and we value your feedback.   Thank you for choosing [facility] for your healthcare needs.